# Patient Record
Sex: MALE | Race: WHITE | NOT HISPANIC OR LATINO | URBAN - METROPOLITAN AREA
[De-identification: names, ages, dates, MRNs, and addresses within clinical notes are randomized per-mention and may not be internally consistent; named-entity substitution may affect disease eponyms.]

---

## 2020-09-28 ENCOUNTER — AMBULATORY - HEALTHEAST (OUTPATIENT)
Dept: CARDIAC REHAB | Facility: CLINIC | Age: 65
End: 2020-09-28

## 2020-09-28 DIAGNOSIS — Z95.1 S/P CABG X 4: ICD-10-CM

## 2020-10-01 ENCOUNTER — AMBULATORY - HEALTHEAST (OUTPATIENT)
Dept: CARDIAC REHAB | Facility: CLINIC | Age: 65
End: 2020-10-01

## 2020-10-01 DIAGNOSIS — Z95.1 S/P CABG X 4: ICD-10-CM

## 2020-10-01 RX ORDER — AMOXICILLIN 250 MG
1 CAPSULE ORAL DAILY
Status: SHIPPED | COMMUNITY
Start: 2020-10-01

## 2020-10-01 RX ORDER — POLYETHYLENE GLYCOL 3350 17 G/17G
17 POWDER, FOR SOLUTION ORAL DAILY
Status: SHIPPED | COMMUNITY
Start: 2020-10-01

## 2020-10-01 RX ORDER — LANOLIN ALCOHOL/MO/W.PET/CERES
3 CREAM (GRAM) TOPICAL
Status: SHIPPED | COMMUNITY
Start: 2020-10-01

## 2020-10-01 RX ORDER — NICOTINE 21 MG/24HR
1 PATCH, TRANSDERMAL 24 HOURS TRANSDERMAL EVERY 24 HOURS
Status: SHIPPED | COMMUNITY
Start: 2020-10-01

## 2020-10-01 RX ORDER — ACETAMINOPHEN 500 MG
500 TABLET ORAL EVERY 6 HOURS PRN
Status: SHIPPED | COMMUNITY
Start: 2020-10-01

## 2020-10-01 RX ORDER — ROSUVASTATIN CALCIUM 20 MG/1
20 TABLET, COATED ORAL AT BEDTIME
Status: SHIPPED | COMMUNITY
Start: 2020-10-01

## 2020-10-01 RX ORDER — NITROGLYCERIN 0.4 MG/1
0.4 TABLET SUBLINGUAL EVERY 5 MIN PRN
Status: SHIPPED | COMMUNITY
Start: 2020-10-01

## 2020-10-01 RX ORDER — OXYCODONE HYDROCHLORIDE 5 MG/1
5 CAPSULE ORAL EVERY 4 HOURS PRN
Status: SHIPPED | COMMUNITY
Start: 2020-10-01

## 2020-10-07 ENCOUNTER — AMBULATORY - HEALTHEAST (OUTPATIENT)
Dept: CARDIAC REHAB | Facility: CLINIC | Age: 65
End: 2020-10-07

## 2020-10-07 DIAGNOSIS — Z95.1 S/P CABG X 4: ICD-10-CM

## 2020-10-09 ENCOUNTER — AMBULATORY - HEALTHEAST (OUTPATIENT)
Dept: CARDIAC REHAB | Facility: CLINIC | Age: 65
End: 2020-10-09

## 2020-10-09 DIAGNOSIS — Z95.5 S/P CORONARY ARTERY STENT PLACEMENT: ICD-10-CM

## 2020-10-09 DIAGNOSIS — Z95.1 S/P CABG X 4: ICD-10-CM

## 2020-10-14 ENCOUNTER — AMBULATORY - HEALTHEAST (OUTPATIENT)
Dept: CARDIAC REHAB | Facility: CLINIC | Age: 65
End: 2020-10-14

## 2020-10-14 DIAGNOSIS — Z95.1 S/P CABG X 4: ICD-10-CM

## 2020-10-14 DIAGNOSIS — Z95.5 S/P CORONARY ARTERY STENT PLACEMENT: ICD-10-CM

## 2020-10-16 ENCOUNTER — AMBULATORY - HEALTHEAST (OUTPATIENT)
Dept: CARDIAC REHAB | Facility: CLINIC | Age: 65
End: 2020-10-16

## 2020-10-16 DIAGNOSIS — Z95.1 S/P CABG X 4: ICD-10-CM

## 2020-10-16 DIAGNOSIS — Z95.5 S/P CORONARY ARTERY STENT PLACEMENT: ICD-10-CM

## 2020-10-21 ENCOUNTER — AMBULATORY - HEALTHEAST (OUTPATIENT)
Dept: CARDIAC REHAB | Facility: CLINIC | Age: 65
End: 2020-10-21

## 2020-10-21 DIAGNOSIS — Z95.1 S/P CABG X 4: ICD-10-CM

## 2020-10-21 DIAGNOSIS — I21.4 NSTEMI (NON-ST ELEVATED MYOCARDIAL INFARCTION) (H): ICD-10-CM

## 2020-10-23 ENCOUNTER — AMBULATORY - HEALTHEAST (OUTPATIENT)
Dept: CARDIAC REHAB | Facility: CLINIC | Age: 65
End: 2020-10-23

## 2020-10-23 DIAGNOSIS — Z95.1 S/P CABG X 4: ICD-10-CM

## 2020-10-23 DIAGNOSIS — I21.4 NSTEMI (NON-ST ELEVATED MYOCARDIAL INFARCTION) (H): ICD-10-CM

## 2020-11-04 ENCOUNTER — AMBULATORY - HEALTHEAST (OUTPATIENT)
Dept: CARDIAC REHAB | Facility: CLINIC | Age: 65
End: 2020-11-04

## 2020-11-04 DIAGNOSIS — Z95.5 S/P CORONARY ARTERY STENT PLACEMENT: ICD-10-CM

## 2020-11-04 DIAGNOSIS — I21.4 NSTEMI (NON-ST ELEVATED MYOCARDIAL INFARCTION) (H): ICD-10-CM

## 2020-11-04 DIAGNOSIS — Z95.1 S/P CABG X 4: ICD-10-CM

## 2020-11-06 ENCOUNTER — AMBULATORY - HEALTHEAST (OUTPATIENT)
Dept: CARDIAC REHAB | Facility: CLINIC | Age: 65
End: 2020-11-06

## 2020-11-06 DIAGNOSIS — I21.4 NSTEMI (NON-ST ELEVATED MYOCARDIAL INFARCTION) (H): ICD-10-CM

## 2020-11-06 DIAGNOSIS — Z95.1 S/P CABG X 4: ICD-10-CM

## 2020-11-13 ENCOUNTER — AMBULATORY - HEALTHEAST (OUTPATIENT)
Dept: CARDIAC REHAB | Facility: CLINIC | Age: 65
End: 2020-11-13

## 2020-11-13 DIAGNOSIS — I21.4 NSTEMI (NON-ST ELEVATED MYOCARDIAL INFARCTION) (H): ICD-10-CM

## 2020-11-13 DIAGNOSIS — Z95.1 S/P CABG X 4: ICD-10-CM

## 2020-11-25 ENCOUNTER — AMBULATORY - HEALTHEAST (OUTPATIENT)
Dept: CARDIAC REHAB | Facility: CLINIC | Age: 65
End: 2020-11-25

## 2020-11-25 DIAGNOSIS — Z95.1 S/P CABG X 4: ICD-10-CM

## 2020-11-25 DIAGNOSIS — I21.4 NSTEMI (NON-ST ELEVATED MYOCARDIAL INFARCTION) (H): ICD-10-CM

## 2020-11-25 DIAGNOSIS — Z95.5 S/P CORONARY ARTERY STENT PLACEMENT: ICD-10-CM

## 2020-11-27 ENCOUNTER — AMBULATORY - HEALTHEAST (OUTPATIENT)
Dept: CARDIAC REHAB | Facility: CLINIC | Age: 65
End: 2020-11-27

## 2020-11-27 DIAGNOSIS — I21.4 NSTEMI (NON-ST ELEVATED MYOCARDIAL INFARCTION) (H): ICD-10-CM

## 2020-11-27 DIAGNOSIS — Z95.1 S/P CABG X 4: ICD-10-CM

## 2020-12-02 ENCOUNTER — AMBULATORY - HEALTHEAST (OUTPATIENT)
Dept: CARDIAC REHAB | Facility: CLINIC | Age: 65
End: 2020-12-02

## 2020-12-02 DIAGNOSIS — Z95.1 S/P CABG X 4: ICD-10-CM

## 2020-12-02 DIAGNOSIS — I21.4 NSTEMI (NON-ST ELEVATED MYOCARDIAL INFARCTION) (H): ICD-10-CM

## 2020-12-04 ENCOUNTER — AMBULATORY - HEALTHEAST (OUTPATIENT)
Dept: CARDIAC REHAB | Facility: CLINIC | Age: 65
End: 2020-12-04

## 2020-12-04 DIAGNOSIS — I21.4 NSTEMI (NON-ST ELEVATED MYOCARDIAL INFARCTION) (H): ICD-10-CM

## 2020-12-04 DIAGNOSIS — Z95.1 S/P CABG X 4: ICD-10-CM

## 2020-12-09 ENCOUNTER — AMBULATORY - HEALTHEAST (OUTPATIENT)
Dept: CARDIAC REHAB | Facility: CLINIC | Age: 65
End: 2020-12-09

## 2020-12-09 DIAGNOSIS — I21.4 NSTEMI (NON-ST ELEVATED MYOCARDIAL INFARCTION) (H): ICD-10-CM

## 2020-12-09 DIAGNOSIS — Z95.1 S/P CABG X 4: ICD-10-CM

## 2020-12-11 ENCOUNTER — AMBULATORY - HEALTHEAST (OUTPATIENT)
Dept: CARDIAC REHAB | Facility: CLINIC | Age: 65
End: 2020-12-11

## 2020-12-11 DIAGNOSIS — Z95.1 S/P CABG X 4: ICD-10-CM

## 2020-12-11 DIAGNOSIS — I21.4 NSTEMI (NON-ST ELEVATED MYOCARDIAL INFARCTION) (H): ICD-10-CM

## 2020-12-18 ENCOUNTER — AMBULATORY - HEALTHEAST (OUTPATIENT)
Dept: CARDIAC REHAB | Facility: CLINIC | Age: 65
End: 2020-12-18

## 2020-12-18 DIAGNOSIS — I21.4 NSTEMI (NON-ST ELEVATED MYOCARDIAL INFARCTION) (H): ICD-10-CM

## 2020-12-18 DIAGNOSIS — Z95.1 S/P CABG X 4: ICD-10-CM

## 2021-06-05 VITALS — WEIGHT: 215.7 LBS

## 2021-06-05 VITALS — WEIGHT: 221.6 LBS

## 2021-06-05 VITALS — WEIGHT: 218.5 LBS

## 2021-06-05 VITALS — WEIGHT: 221.8 LBS

## 2021-06-05 VITALS — WEIGHT: 219.4 LBS

## 2021-06-05 VITALS — WEIGHT: 215.1 LBS

## 2021-06-05 VITALS — WEIGHT: 218.6 LBS

## 2021-06-05 VITALS — WEIGHT: 217.1 LBS

## 2021-06-05 VITALS — WEIGHT: 219.9 LBS

## 2021-06-05 VITALS — WEIGHT: 216.9 LBS

## 2021-06-05 VITALS — WEIGHT: 222.6 LBS

## 2021-06-05 VITALS — WEIGHT: 215.5 LBS

## 2021-06-05 VITALS — WEIGHT: 218.1 LBS

## 2021-06-05 VITALS — WEIGHT: 219.3 LBS

## 2021-06-11 NOTE — PROGRESS NOTES
ITP ASSESSMENT   Assessment Day: Initial    Session Number: 1/2  Precautions: Surgical, PVD, R carotid stenosis    Diagnosis: MI;CAB    Risk Stratification: Medium    Referring Provider: Alejandro Shaikh MD  EXERCISE  Exercise Assessment: Initial       6 Minute Walk Test   Pre   Pre Exercise HR: 75                    Pre Exercise BP: 126/72      Peak  Peak HR: 93                   Peak BP: 146/68    Peak feet: 1025    Peak O2 SAT: 98    Peak RPE: 3-4    Peak MPH: 1.94      Symptoms:  Peak Symptoms: denies sx      5 mins. Post  5 Min Post HR: 76    5 Min Post BP: 134/64                           Exercise Plan  Goals Next 30 days  ADL'S: Goal #1: Increase MET level to 3-4 to support goal of resuming grocery shopping.    Leisure: Goal #2: Walk 15-20 mins, 3-4x/week at home.    Work: LTG: Increase MET level to 6-8 to support goal of resuming home improvement projects.      Education Goals: Patient can state cardiac s/s and appropriate emergency response.    Education Goals Met: Has system for taking medication.;Medication review.      Exercise Prescription  Exercise Mode: Treadmill;Bike;Nustep;Stairs    Frequency: 2x/week    Duration: 30-45 mins    Intensity / THR: 20-30 beats above resting heart rate    RPE 11-14  Progression / Met level: 3-4    Resistive Training?: No      Current Exercise (mins/week): 10      Interventions  Home Exercise:  Mode: Walking    Frequency: 3-4x/week    Duration: 10-15 mins, 1-2x/day      Education Material : Educational videos;Provide written material;Individual education and counseling;Offer educational classes      Education Completed  Exercise Education Completed: Cardiac Anatomy;Signs and Symptoms;Medication review;RPE;Emergency Plan;Home Exercise;Warm up/cool down;FITT Principles;BP/HR Reponse to exercise;Benefits of Exercise;End point of exercise              Exercise Follow-up/Discharge  Follow up/Discharge: Skilled therapy- pt needs monitored ex to safely progress to goal of  6-8 METs.  Pt also needs to improve U/E strength when restrictions are lifted to support goal of resuming home improvement projects.   NUTRITION  Nutrition Assessment: Initial      Nutrition Risk Factors:  Nutrition Risk Factors: Dyslipidemia;Overweight  Cholesterol: 188 (9/9/2020)  LDL: 111  HDL: 33  Triglycerides: 222      Nutrition Plan  Interventions  Other Nutrition Intervention: Therapist/Pt Discussion;Educational Videos;Provide with Written Material      Education Completed  Nutrition Education Completed: Risk factor overview      Goals  Nutrition Goals (Next 30 days): Patient can identify their risk factors for CAD;Patient will follow a low sodium diet;Patient will follow a low saturated fat diet;Patient knows appropriate portion size;Patient will lose weight      Goals Met  Nutrition Goals Met: Completed Nutritional Risk Screen;Provided Rate your Plate Survey;Reviewed Dietitian schedule      Height, Weight, and  BMI  Weight: 215 lb 8 oz (97.8 kg)  Height: 6' (1.829 m)  BMI: 29.22      Nutrition Follow-up  Follow-up/Discharge: Pt may be interested in dieti consult.  He reports that he follows a heart healthy diet.  He does a lot of the cooking, including a lot of grilling, and does not add salt during cooking or afterwards.         Other Risk Factors  Other Risk Factor Assessment: Initial      HTN Risk Factor: Hypertension      Pre Exercise BP: 126/72  Post Exercise BP: 134/64      Hypertension Plan  Goals  HTN Goals: Follow low sodium diet;Exercises regularly      Goals Met  HTN Goals Met: Take medication as prescribed      HTN Interventions  HTN Interventions: Therapist/patient discussion;Provide written material;Offer educational videos;Offer educational classes      HTN Education Completed  HTN Education Completed: Medication review;Risk factor overview      Tobacco Risk Factor: Tobacco      Initial Use:: None  Current Use:: None-quit 9/6/20      Tobacco Plan  Tobacco Goals  Tobacco Goals: Patient remain  tobacco free      Goals Met  Tobacco Goals Met: Patient remain tobacco free      Tobacco Interventions  Tobacco Interventions: Therapist/patient discussion      Tobacco Education Completed  Tobacco Education Completed: Health benefits of tobacco cessation;Risk factor overview      Risk Factor Follow-up   Follow-up/Discharge: Pt was smoking 1/2 ppd for 30+ years, but quit right before having his surgery.  He is using the nicoderm patch to help him quit.  Pt does have a system (uses a spreadsheet) to help make sure he is compliant with his meds.     PSYCHOSOCIAL  Psychosocial Assessment: Initial       [facility] Q [facility] L Summary Score: 24      PHQ-9 Total Score: 2      Psychosocial Risk Factor: NA      Psychosocial Plan  Interventions  If PHQ-9 is >9, send letter to MD  Interventions: Offer educational videos and classes;Provide written material;Individual education and counseling       Education Completed  Education Completed: S/S of depression      Goals  Goals (Next 30 days): Identify stressors;Improvement in [employer] COOP score;Practicing stress management skills      Goals Met  Goals Met: Identified Support system      Psychosocial Follow-up  Follow-up/Discharge: Pt denies regular stress.  He identifies his wife and daughter as part of his support system.             Patient involved in Goal setting?: Yes      Signature: _____________________________________________________________    Date: __________________    Time: __________________

## 2021-06-11 NOTE — PROGRESS NOTES
Cardiac Rehab  Phase II Assessment    Assessment Date: 10/1/2020    Diagnosis: NSTEMI  Date of Onset: 9/6/2020  Procedure: CABG x 4  Date of Onset: 9/11/2020  ICD/Pacemaker: No Parameters: NA  Post-op Complications: none  ECG History: SR EF%:50-55, per echo 9/7/2020  Past Medical History: s/p stents (4 different procedures, 7782-9140); no DM; smoker- 1/2 ppd for 30 yrs, quit 9/6/2020;     Physical Assessment  Precautions/ Physical Limitations: Surgical  Oxygen: No  O2 Sats: 98 Lung Sounds: clear Edema: none  Incisions: clean/dry/intact  Sleeping Pattern: fair   Appetite: fair   Nutrition Risk Screen: Completed.    Pain  Location: incisional  Characteristics:ache  Intensity: (0-10 scale) 4  Current Pain Management: meds- oxycodone  Intervention: meds- oxycodone  Response: decreases to 1-2/10    Psychosocial/ Emotional Health  1. In the past 12 months, have you been in a relationship where you have been abused physically, emotionally, sexually or financially? No  notified: NA  2. Who do you turn to for emotional support?: wife  3. Do you have cultural or spiritual needs? No  4. Have there been any major life changes in the past 12 months? No    Referral Information  Primary Physician: Dell Blanca MD; [facility]  Cardiologist: Dr. Alejandro Shaikh  Surgeon: Dr. Maria Luz Martines    Home exercise/Equipment: none (pt reports he is looking for a TM)    Patient's long-term goal(s): Resume pre-surgery activities, especially home improvement projects.    1. Living Accommodations: Home Steps: Yes      Support people at home: wife, daughter   2. Marital Status:   3. Family is able to assist with cares      Lutheran/Community involvement: none  4. Recreation/Hobbies: fishing, golfing

## 2021-06-12 NOTE — PROGRESS NOTES
Melquiades Aleman has participated in 9 sessions of Phase II Cardiac Rehab.    Progress Report:   Cardiac Rehab Treatment Progress Report 10/14/2020 10/16/2020 10/21/2020 10/23/2020 11/4/2020   Weight 215 lbs 11 oz 218 lbs 8 oz 215 lbs 2 oz 218 lbs 2 oz 218 lbs 8 oz   Pre Exercise  HR 81 82 85 72 89   Pre Exercise /72 118/72 124/66 128/78 104/66   Pre Blood Sugar (mg/dl) - - - - -   Treadmill Peak  108 104 102 107   Treadmill Peak Blood Pressure 132/64 140/62 - - 128/62   Nustep Peak Heart Rate 94 98 - 92 101   Nustep Peak Blood Pressure - - - 156/66 -   Heart Rate 86 85 87 81 80   Post Exercise /60 102/56 92/58 96/54 116/64   ECG SR/TWI/?IVCD SR/ TWI SR/TWI with IVCD SR/TWI, IVCD SR, IVCD   Total Exercise Minutes 30 34 28 43 37         Current Status:  Currently exercising without complaints or symptoms.  Progressing well    If Physician recommends change in treatment plan, please place orders.        __________________________________________________      _____________  Signature                                                                                                  Date

## 2021-06-12 NOTE — PROGRESS NOTES
ITP ASSESSMENT   Assessment Day: 30 Day    Session Number: 8  Precautions: Surgical, PVD, R carotid stenosis    Diagnosis: MI;CAB    Risk Stratification: Medium    Referring Provider: Alejandro Shaikh MD  EXERCISE  Exercise Assessment: Reassessment                         Exercise Plan  Goals Next 30 days  ADL'S: Goal #1: Increase MET level to 3-4 to support goal of resuming grocery shopping.    Leisure: Goal #2: Walk 15-20 mins, 3-4x/week at home.    Work: LTG: Increase MET level to 6-8 to support goal of resuming home improvement projects.      Education Goals: Patient can state cardiac s/s and appropriate emergency response.    Education Goals Met: Has system for taking medication.;Medication review.                          Goals Met  Initial Progression: Unable to assess goals, patient is not present, will continue with same goals, and reasses on next ITP.  Paitient has reached 3.2 METs on treadmill, 2.9 METs on nustep.  Patient is limited with calf discomfort.      Exercise Prescription  Exercise Mode: Treadmill;Bike;Nustep;Stairs    Frequency: 2x/week    Duration: 30-45 mins    Intensity / THR: 20-30 beats above resting heart rate    RPE 11-14  Progression / Met level: 3.5-4    Resistive Training?: No      Current Exercise (mins/week): 70      Interventions  Home Exercise:  Mode: Walking    Frequency: 3-4x/week    Duration: 10-15 mins, 1-2x/day      Education Material : Educational videos;Provide written material;Individual education and counseling;Offer educational classes      Education Completed  Exercise Education Completed: Cardiac Anatomy;Signs and Symptoms;Medication review;RPE;Emergency Plan;Home Exercise;Warm up/cool down;FITT Principles;BP/HR Reponse to exercise;Benefits of Exercise;End point of exercise              Exercise Follow-up/Discharge  Follow up/Discharge: Continues to need skilled therapy to monitor cv response to increase to 6-8 METs.   NUTRITION  Nutrition Assessment:  Reassessment    Nutrition Risk Factors:  Nutrition Risk Factors: Dyslipidemia;Overweight  Cholesterol: 188  LDL: 111  HDL: 33  Triglycerides: 222      Nutrition Plan  Interventions  Other Nutrition Intervention: Therapist/Pt Discussion;Educational Videos;Provide with Written Material    Education Completed  Nutrition Education Completed: Risk factor overview    Goals  Nutrition Goals (Next 30 days): Patient can identify their risk factors for CAD;Patient will follow a low sodium diet;Patient will follow a low saturated fat diet;Patient knows appropriate portion size;Patient will lose weight      Goals Met  Nutrition Goals Met: Completed Nutritional Risk Screen;Provided Rate your Plate Survey;Reviewed Dietitian schedule      Height, Weight, and  BMI  Weight: 218 lb 1.6 oz (98.9 kg)  Height: 6' (1.829 m)  BMI: 29.57      Nutrition Follow-up  Follow-up/Discharge: Pt may be interested in dieti consult.  He reports that he follows a heart healthy diet.  He does a lot of the cooking, including a lot of grilling, and does not add salt during cooking or afterwards.  Will address on next ITP.       Other Risk Factors  Other Risk Factor Assessment: Reassessment      HTN Risk Factor: Hypertension      Pre Exercise BP: 128/78  Post Exercise BP: 96/54      Hypertension Plan  Goals  HTN Goals: Follow low sodium diet;Exercises regularly      Goals Met  HTN Goals Met: Take medication as prescribed      HTN Interventions  HTN Interventions: Therapist/patient discussion;Provide written material;Offer educational videos;Offer educational classes      HTN Education Completed  HTN Education Completed: Medication review;Risk factor overview      Tobacco Risk Factor: Tobacco      Initial Use:: None  Current Use:: None-quit 9/6/20      Tobacco Plan  Tobacco Goals  Tobacco Goals: Patient remain tobacco free      Goals Met  Tobacco Goals Met: Patient remain tobacco free      Tobacco Interventions  Tobacco Interventions: Therapist/patient  discussion      Tobacco Education Completed  Tobacco Education Completed: Health benefits of tobacco cessation;Risk factor overview      Risk Factor Follow-up   Follow-up/Discharge: Pt was smoking 1/2 ppd for 30+ years, but quit right before having his surgery.  He is using the nicoderm patch to help him quit.  Pt does have a system (uses a spreadsheet) to help make sure he is compliant with his meds.  Will address on next ITP.   PSYCHOSOCIAL  Psychosocial Assessment: Reassessment       [place] COOP Q of L Summary Score: 24      PHQ-9 Total Score: 2      Psychosocial Risk Factor: NA      Psychosocial Plan  Interventions  Interventions: Offer educational videos and classes;Provide written material;Individual education and counseling      Education Completed  Education Completed: S/S of depression      Goals  Goals (Next 30 days): Identify stressors;Improvement in [employer] COOP score;Practicing stress management skills      Goals Met  Goals Met: Identified Support system      Psychosocial Follow-up  Follow-up/Discharge: Pt denies regular stress.  He identifies his wife and daughter as part of his support system.  Will address on next ITP.           Patient involved in Goal setting?: No      Signature: _____________________________________________________________    Date: __________________    Time: __________________

## 2021-06-13 NOTE — PROGRESS NOTES
ITP ASSESSMENT   Assessment Day: 90 Day    Session Number: 18  Precautions: Surgical, PVD, R carotid stenosis (had endartarectomy 11/17/20).    Diagnosis: MI;CAB    Risk Stratification: Medium    Referring Provider: Alejandro Shaikh MD   ITPs sent to Dr. Silva   EXERCISE  Exercise Assessment: Reassessment                         Exercise Plan  Goals Next 30 days  ADL'S: Goal #1: Increase upper body strength by doing weights 2x/week using weights or arm ergometor in Cardiac Rehab     Leisure: Goal #2: Walk at home 2-3x/week for 15-20 mins    Work: LTG: Increase MET level to 6-8 to support goal of resuming home improvement projects in the next 60 days.      Education Goals: Patient can state cardiac s/s and appropriate emergency response.    Education Goals Met: Has system for taking medication.;Medication review.                          Goals Met  60 day ADL'S goals met: Goal met: Pt has reached L8 on Nustep. Pt able to tolerate L 8 well. Continue to increase upper body strength.     60 day Leisure goals met: Goal not met: Pt does not have a regular HEP.     60 day Work goals met: Goal not met: Pt has not reached LTG    60 Day Progression: Pt has reached       30 day ADL'S goals met: Goal met- pt has progressed to 3.8 METs and has resumed grocery shopping.    30 day Leisure goals met: Goal not met- pt is not exercising at home.    30 day Work goals met: Goal not met- pt has not progressed to 6-8 METs.    30 Day Progression: Pt has progressed to 3.8 METs on TM for 10 mins.  He has not been exercising at home, but is planning on buying a TM to use.  He has not been limited by anything.      Initial Progression: Unable to assess goals, patient is not present, will continue with same goals, and reasses on next ITP.  Paitient has reached 3.2 METs on treadmill, 2.9 METs on nustep.  Patient is limited with calf discomfort.      Exercise Prescription  Exercise Mode: Treadmill;Bike;Nustep;Stairs    Frequency:  2x/week    Duration: 30-45 min    Intensity / THR: 20-30 beats above resting heart rate    RPE 11-14  Progression / Met level: 4-5    Resistive Training?: Yes      Current Exercise (mins/week): 15      Interventions  Home Exercise:  Mode: walking    Frequency: 2-3x/week    Duration: 5 min      Education Material : Educational videos;Provide written material;Individual education and counseling;Offer educational classes      Education Completed  Exercise Education Completed: Cardiac Anatomy;Signs and Symptoms;Medication review;RPE;Emergency Plan;Home Exercise;Warm up/cool down;FITT Principles;BP/HR Reponse to exercise;Benefits of Exercise;End point of exercise              Exercise Follow-up/Discharge  Follow up/Discharge: Skilled therapy needed to monitor hear rate and abnormal arrythmias with increase in MET level. Monitor sx/sx to safely progress to 6-8 METs to reach LTG. Pt plans on buying a treadmill soon. Encouraged pt to start regular home ex program. Utilize continuous and interval training.    NUTRITION  Nutrition Assessment: Reassessment      Nutrition Risk Factors:  Nutrition Risk Factors: Dyslipidemia;Overweight  Cholesterol: 188  LDL: 111  HDL: 33  Triglycerides: 222      Nutrition Plan  Interventions  Diet Consult: Declined    Other Nutrition Intervention: Therapist/Pt Discussion;Educational Videos;Provide with Written Material    Follow-Up Rate Your Plate Score: 57      Education Completed  Nutrition Education Completed: Low Saturated fat diet;Risk factor overview;Low sodium diet      Goals  Nutrition Goals (Next 30 days): Patient will lose weight      Goals Met  Nutrition Goals Met: Completed Nutritional Risk Screen;Provided Rate your Plate Survey;Reviewed Dietitian schedule;Patient follows a low sodium diet;Patient can identify their risk factors for CAD;Patient states following a low saturated fat diet;Patient knows appropriate portion size      Height, Weight, and  BMI  Weight: 222 lb 9.6 oz (101  kg)  Height: 6' (1.829 m)  BMI: 30.18      Nutrition Follow-up  Follow-up/Discharge: Pt feels he follows a heart healthy diet most of the time. Pt's goal weight is 212-215lb. Plan to lose 1-2 lb in the next month.         Other Risk Factors  Other Risk Factor Assessment: Reassessment      HTN Risk Factor: Hypertension      Pre Exercise BP: 102/64  Post Exercise BP: 106/66      Hypertension Plan  Goals  HTN Goals: Patient demonstrates understanding of HTN, no goals identified for the next 30 days      Goals Met  HTN Goals Met: Follow low sodium diet;Take medication as prescribed;Exercises regularly      HTN Interventions  HTN Interventions: Therapist/patient discussion;Provide written material;Offer educational videos;Offer educational classes      HTN Education Completed  HTN Education Completed: Medication review;Risk factor overview      Tobacco Risk Factor: Tobacco      Initial Use:: None  Current Use:: none      Tobacco Plan  Tobacco Goals  Tobacco Goals: Patient remain tobacco free      Goals Met  Tobacco Goals Met: Patient remain tobacco free      Tobacco Interventions  Tobacco Interventions: Therapist/patient discussion      Tobacco Education Completed  Tobacco Education Completed: Health benefits of tobacco cessation;Risk factor overview   PSYCHOSOCIAL  Psychosocial Assessment: Reassessment       [place] COOP Q of L Summary Score: 24      PHQ-9 Total Score: 2      Psychosocial Risk Factor: NA      Psychosocial Plan  Interventions  Interventions: Offer educational videos and classes;Provide written material;Individual education and counseling      Education Completed  Education Completed: S/S of depression      Goals  Goals (Next 30 days): Improvement in [employer] COOP score      Goals Met  Goals Met: Identified Support system;Practicing stress management skills      Psychosocial Follow-up  Follow-up/Discharge: Pt reports some stress at work. 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 Pt  enjoys watching movies and taking naps to relax.              Patient involved in Goal setting?: Yes      Signature: _____________________________________________________________    Date: __________________    Time: __________________

## 2021-06-13 NOTE — PROGRESS NOTES
ITP ASSESSMENT   Assessment Day: 60 Day    Session Number: 13  Precautions: Surgical, PVD, R carotid stenosis    Diagnosis: MI;CAB    Risk Stratification: Medium    Referring Provider: Alejandro Shaikh MD  EXERCISE  Exercise Assessment: Reassessment                                Exercise Plan  Goals Next 30 days  ADL'S: Goal #1: Increase U/E strength by doing increasing Nustep level to 8.    Leisure: Goal #2: Walk at home 2-3x/week for 15-20 mins    Work: LTG: Increase MET level to 6-8 to support goal of resuming home improvement projects in the next 60 days.      Education Goals: Patient can state cardiac s/s and appropriate emergency response.    Education Goals Met: Has system for taking medication.;Medication review.                          Goals Met  30 day ADL'S goals met: Goal met- pt has progressed to 3.8 METs and has resumed grocery shopping.    30 day Leisure goals met: Goal not met- pt is not exercising at home.    30 day Work goals met: Goal not met- pt has not progressed to 6-8 METs.    30 Day Progression: Pt has progressed to 3.8 METs on TM for 10 mins.  He has not been exercising at home, but is planning on buying a TM to use.  He has not been limited by anything.      Initial Progression: Unable to assess goals, patient is not present, will continue with same goals, and reasses on next ITP.  Paitient has reached 3.2 METs on treadmill, 2.9 METs on nustep.  Patient is limited with calf discomfort.      Exercise Prescription  Exercise Mode: Treadmill;Bike;Nustep;Stairs    Frequency: 2x/week    Duration: 30-45 mins    Intensity / THR: 20-30 beats above resting heart rate    RPE 11-14  Progression / Met level: 4-5    Resistive Training?: Yes      Current Exercise (mins/week): 70      Interventions  Home Exercise:  Mode: Walking    Frequency: 2-3x/week    Duration: 15-20 mins      Education Material : Educational videos;Provide written material;Individual education and counseling;Offer educational  classes      Education Completed  Exercise Education Completed: Cardiac Anatomy;Signs and Symptoms;Medication review;RPE;Emergency Plan;Home Exercise;Warm up/cool down;FITT Principles;BP/HR Reponse to exercise;Benefits of Exercise;End point of exercise              Exercise Follow-up/Discharge  Follow up/Discharge: Skilled therapy- needs monitored ex to safely progress to long-term goal of 6-8 METs.  Also needs encouragement and guidance to start home ex program.   NUTRITION  Nutrition Assessment: Reassessment      Nutrition Risk Factors:  Nutrition Risk Factors: Dyslipidemia;Overweight  Cholesterol: 188  LDL: 111  HDL: 33  Triglycerides: 222      Nutrition Plan  Interventions  Diet Consult: Declined    Other Nutrition Intervention: Therapist/Pt Discussion;Educational Videos;Provide with Written Material    No data recorded  Follow-Up Rate Your Plate Score: 57      Education Completed  Nutrition Education Completed: Low Saturated fat diet;Risk factor overview;Low sodium diet      Goals  Nutrition Goals (Next 30 days): Patient will lose weight      Goals Met  Nutrition Goals Met: Completed Nutritional Risk Screen;Provided Rate your Plate Survey;Reviewed Dietitian schedule;Patient follows a low sodium diet;Patient can identify their risk factors for CAD;Patient states following a low saturated fat diet;Patient knows appropriate portion size      Height, Weight, and  BMI  Weight: 219 lb 6.4 oz (99.5 kg)  Height: 6' (1.829 m)  BMI: 29.75      Nutrition Follow-up  Follow-up/Discharge: Pt feels he follows a heart healthy diet most of the time         Other Risk Factors  Other Risk Factor Assessment: Reassessment      HTN Risk Factor: Hypertension      Pre Exercise BP: 140/72  Post Exercise BP: 112/60      Hypertension Plan  Goals  HTN Goals: Patient demonstrates understanding of HTN, no goals identified for the next 30 days      Goals Met  HTN Goals Met: Follow low sodium diet;Take medication as prescribed;Exercises  regularly      HTN Interventions  HTN Interventions: Therapist/patient discussion;Provide written material;Offer educational videos;Offer educational classes      HTN Education Completed  HTN Education Completed: Medication review;Risk factor overview      Tobacco Risk Factor: Tobacco      Initial Use:: None  Current Use:: none      Tobacco Plan  Tobacco Goals  Tobacco Goals: Patient remain tobacco free      Goals Met  Tobacco Goals Met: Patient remain tobacco free      Tobacco Interventions  Tobacco Interventions: Therapist/patient discussion      Tobacco Education Completed  Tobacco Education Completed: Health benefits of tobacco cessation;Risk factor overview      Risk Factor Follow-up   Follow-up/Discharge: Pt was smoking 1/2 ppd for 30+ years, but quit right before having his surgery.  He is using the nicoderm patch to help him quit.  Pt does have a system (uses a spreadsheet) to help make sure he is compliant with his meds.  Will address on next ITP.     PSYCHOSOCIAL  Psychosocial Assessment: Reassessment       [place] COOP Q of L Summary Score: 24      PHQ-9 Total Score: 2      Psychosocial Risk Factor: NA      Psychosocial Plan  Interventions  Interventions: Offer educational videos and classes;Provide written material;Individual education and counseling      Education Completed  Education Completed: S/S of depression      Goals  Goals (Next 30 days): Improvement in [place] COOP score      Goals Met  Goals Met: Identified Support system;Practicing stress management skills      Psychosocial Follow-up  Follow-up/Discharge: Pt denies regular stress.  He identifies his wife and daughter as part of his support system.  Will address on next ITP.             Patient involved in Goal setting?: Yes      Signature: _____________________________________________________________    Date: __________________    Time: __________________

## 2021-06-14 NOTE — PROGRESS NOTES
ITP ASSESSMENT   Assessment Day: 120 Day    Session Number: 18  Precautions: Surgical, PVD, R carotid stenosis (had endartarectomy 11/17/20).    Diagnosis: MI;CAB    Risk Stratification: Medium    Referring Provider: Alejandro Shaikh MD   ITP sent to Dr. Silva  EXERCISE  Exercise Assessment: Discharge                         Exercise Plan  Goals Next 30 days  ADL'S: Goal #1: Increase upper body strength by doing weights 2x/week using weights or arm ergometor in Cardiac Rehab     Leisure: Goal #2: Walk at home 2-3x/week for 15-20 mins    Work: LTG: Increase MET level to 6-8 to support goal of resuming home improvement projects in the next 60 days.      Education Goals: Patient can state cardiac s/s and appropriate emergency response.    Education Goals Met: Has system for taking medication.;Medication review.                          Goals Met  90 Day Progress: Pt has reached 3.6 METs on Nustep. Pt has started intervals.  Patient has chosen to D/C cardiac rehab due to insurance coverage.      60 day ADL'S goals met: Goal met: Pt has reached L8 on Nustep. Pt able to tolerate L 8 well. Continue to increase upper body strength.     60 day Leisure goals met: Goal not met: Pt does not have a regular HEP.     60 day Work goals met: Goal not met: Pt has not reached LTG    60 Day Progression: Pt has reached 3.6 METs on Nustep. Pt has started intervals today and tolerated it well.       30 day ADL'S goals met: Goal met- pt has progressed to 3.8 METs and has resumed grocery shopping.    30 day Leisure goals met: Goal not met- pt is not exercising at home.    30 day Work goals met: Goal not met- pt has not progressed to 6-8 METs.    30 Day Progression: Pt has progressed to 3.8 METs on TM for 10 mins.  He has not been exercising at home, but is planning on buying a TM to use.  He has not been limited by anything.    Initial Progression: Unable to assess goals, patient is not present, will continue with same goals, and  reasses on next ITP.  Paitient has reached 3.2 METs on treadmill, 2.9 METs on nustep.  Patient is limited with calf discomfort.      Exercise Prescription  Exercise Mode: Treadmill;Bike;Nustep;Stairs    Frequency: 2x/week    Duration: 30-45 min    Intensity / THR: 20-30 beats above resting heart rate    RPE 11-14  Progression / Met level: 4-5    Resistive Training?: Yes      Current Exercise (mins/week): 15      Interventions  Home Exercise:  Mode: walking    Frequency: 2-3x/week    Duration: 5 min      Education Material : Educational videos;Provide written material;Individual education and counseling;Offer educational classes      Education Completed  Exercise Education Completed: Cardiac Anatomy;Signs and Symptoms;Medication review;RPE;Emergency Plan;Home Exercise;Warm up/cool down;FITT Principles;BP/HR Reponse to exercise;Benefits of Exercise;End point of exercise              Exercise Follow-up/Discharge  Follow up/Discharge: Pt has reached 3.6 METs on Nustep. Pt has started intervals.  Patient has chosen to D/C cardiac rehab due to insurance coverage.   NUTRITION  Nutrition Assessment: Discharge    Nutrition Risk Factors:  Nutrition Risk Factors: Dyslipidemia;Overweight  Cholesterol: 188  LDL: 111  HDL: 33  Triglycerides: 222      Nutrition Plan  Interventions  Diet Consult: Declined    Other Nutrition Intervention: Therapist/Pt Discussion;Educational Videos;Provide with Written Material    No data recorded  Follow-Up Rate Your Plate Score: 57      Education Completed  Nutrition Education Completed: Low Saturated fat diet;Risk factor overview;Low sodium diet      Goals  Nutrition Goals (Next 30 days): Patient will lose weight      Goals Met  Nutrition Goals Met: Completed Nutritional Risk Screen;Provided Rate your Plate Survey;Reviewed Dietitian schedule;Patient follows a low sodium diet;Patient can identify their risk factors for CAD;Patient states following a low saturated fat diet;Patient knows appropriate  portion size      Height, Weight, and  BMI  Weight: 222 lb 9.6 oz (101 kg)  Height: 6' (1.829 m)  BMI: 30.18      Nutrition Follow-up  Follow-up/Discharge: Pt feels he follows a heart healthy diet most of the time. Pt's goal weight is 212-215lb. Plan to lose 1-2 lb in the next month.  Patient has chosen to D/C at the time due to insurance coverage.         Other Risk Factors  Other Risk Factor Assessment: Discharge      HTN Risk Factor: Hypertension      Pre Exercise BP: 102/64  Post Exercise BP: 110/62      Hypertension Plan  Goals  HTN Goals: Patient demonstrates understanding of HTN, no goals identified for the next 30 days      Goals Met  HTN Goals Met: Follow low sodium diet;Take medication as prescribed;Exercises regularly      HTN Interventions  HTN Interventions: Therapist/patient discussion;Provide written material;Offer educational videos;Offer educational classes      HTN Education Completed  HTN Education Completed: Medication review;Risk factor overview      Tobacco Risk Factor: Tobacco      Initial Use:: None  Current Use:: none      Tobacco Plan  Tobacco Goals  Tobacco Goals: Patient remain tobacco free      Goals Met  Tobacco Goals Met: Patient remain tobacco free      Tobacco Interventions  Tobacco Interventions: Therapist/patient discussion      Tobacco Education Completed  Tobacco Education Completed: Health benefits of tobacco cessation;Risk factor overview      Risk Factor Follow-up   Follow-up/Discharge:  Patient has chosen to D/C at the time due to insurance coverage.     PSYCHOSOCIAL  Psychosocial Assessment: Discharge       [facility] [place] Q of L Summary Score: 24      PHQ-9 Total Score: 2      Psychosocial Risk Factor: NA      Psychosocial Plan  Interventions  Interventions: Offer educational videos and classes;Provide written material;Individual education and counseling      Education Completed  Education Completed: S/S of depression      Goals  Goals (Next 30 days): Improvement in  [facility] CO[facility] score      Goals Met  Goals Met: Identified Support system;Practicing stress management skills      Psychosocial Follow-up  Follow-up/Discharge: Pt reports some stress at work. 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 Pt enjoys watching movies and taking naps to relax.   Patient has chosen to D/C at the time due to insurance coverage.             Patient involved in Goal setting?: No      Signature: _____________________________________________________________    Date: __________________    Time: __________________

## 2025-02-20 ENCOUNTER — TRANSCRIBE ORDERS (OUTPATIENT)
Dept: OTHER | Age: 70
End: 2025-02-20

## 2025-02-20 DIAGNOSIS — I50.20 HFREF (HEART FAILURE WITH REDUCED EJECTION FRACTION) (H): ICD-10-CM

## 2025-02-20 DIAGNOSIS — I48.0 PAROXYSMAL ATRIAL FIBRILLATION (H): Primary | ICD-10-CM

## 2025-02-20 DIAGNOSIS — Z95.1 S/P CABG X 4: ICD-10-CM
